# Patient Record
Sex: FEMALE | Race: ASIAN | NOT HISPANIC OR LATINO | URBAN - METROPOLITAN AREA
[De-identification: names, ages, dates, MRNs, and addresses within clinical notes are randomized per-mention and may not be internally consistent; named-entity substitution may affect disease eponyms.]

---

## 2021-08-31 ENCOUNTER — OFFICE VISIT (OUTPATIENT)
Dept: URBAN - METROPOLITAN AREA CLINIC 35 | Facility: CLINIC | Age: 41
End: 2021-08-31

## 2021-09-17 ENCOUNTER — OFFICE VISIT (OUTPATIENT)
Dept: URBAN - METROPOLITAN AREA CLINIC 35 | Facility: CLINIC | Age: 41
End: 2021-09-17

## 2021-09-17 VITALS
HEART RATE: 91 BPM | DIASTOLIC BLOOD PRESSURE: 78 MMHG | OXYGEN SATURATION: 98 % | HEIGHT: 60 IN | SYSTOLIC BLOOD PRESSURE: 124 MMHG | WEIGHT: 116 LBS | BODY MASS INDEX: 22.78 KG/M2

## 2021-09-17 RX ORDER — MAGNESIUM OXIDE/MAG AA CHELATE 300 MG
1 CAPSULE WITH A MEAL CAPSULE ORAL ONCE A DAY
Qty: 30 | Status: ACTIVE | COMMUNITY

## 2021-09-17 RX ORDER — DOCUSATE SODIUM 100 MG/1
2 CAPSULES CAPSULE ORAL ONCE A DAY
Qty: 60 CAPSULE | Status: ACTIVE | COMMUNITY

## 2021-09-17 NOTE — HPI-MIGRATED HPI
;     Constipation :                                  41 year old female patient presents for constipation consult. Patient states symptoms have been lifelong since laste teens and she is addressing for the first time now. . Patient admits trying colace and magnesium supplement  OTC for relief but states symptoms arent subsiding . Patient admits having 2-3 bowel movements a week. Patient admits stools are small and hard and pebble like . She admits adding more fiber from vegetables and fruit with no relief .  Patient denies rectal bleeding /melena.   Patient admits strenuous bowel movements.   Patient denies having recent abdominal Xray.   Patient denies having recent labs. ;

## 2021-09-17 NOTE — EXAM-MIGRATED EXAMINATIONS
General Examination : Medical assistant was in the room during the examination;     GENERAL APPEARANCE: - alert, in no acute distress, well developed, well nourished;   ORAL CAVITY: - mucosa moist;   NECK/THYROID: - neck supple, full range of motion;   HEART: - no murmurs, regular rate and rhythm, S1, S2 normal;   LUNGS: - clear to auscultation bilaterally, good air movement, no wheezes, rales, rhonchi;   ABDOMEN: - bowel sounds present, no masses palpable, no organomegaly , no rebound tenderness, soft, nontender, nondistended;

## 2021-10-14 ENCOUNTER — OFFICE VISIT (OUTPATIENT)
Dept: URBAN - METROPOLITAN AREA CLINIC 35 | Facility: CLINIC | Age: 41
End: 2021-10-14

## 2021-10-14 VITALS
WEIGHT: 113 LBS | HEART RATE: 110 BPM | DIASTOLIC BLOOD PRESSURE: 74 MMHG | OXYGEN SATURATION: 100 % | SYSTOLIC BLOOD PRESSURE: 106 MMHG | BODY MASS INDEX: 22.19 KG/M2 | HEIGHT: 60 IN

## 2021-10-14 PROBLEM — 88111009 CHANGE IN BOWEL HABIT: Status: ACTIVE | Noted: 2021-10-14

## 2021-10-14 PROBLEM — 14760008 CONSTIPATION: Status: ACTIVE | Noted: 2021-09-17

## 2021-10-14 PROBLEM — 249504006 FLATULENCE: Status: ACTIVE | Noted: 2021-09-17

## 2021-10-14 RX ORDER — SODIUM PICOSULFATE, MAGNESIUM OXIDE, AND ANHYDROUS CITRIC ACID 10; 3.5; 12 MG/160ML; G/160ML; G/160ML
AS DIRECTED LIQUID ORAL
Qty: 1 KIT | OUTPATIENT
Start: 2021-10-14

## 2021-10-14 RX ORDER — MAGNESIUM OXIDE/MAG AA CHELATE 300 MG
1 CAPSULE WITH A MEAL CAPSULE ORAL ONCE A DAY
Qty: 30 | Status: ON HOLD | COMMUNITY

## 2021-10-14 RX ORDER — DOCUSATE SODIUM 100 MG/1
2 CAPSULES CAPSULE ORAL ONCE A DAY
Qty: 60 CAPSULE | Status: ON HOLD | COMMUNITY

## 2021-10-14 NOTE — HPI-MIGRATED HPI
;     Constipation : Patient is present to for her 3 week follow-up for constipation. Patient was given an sample of Linzess 145mcg to help with some relief of her symptoms. Patient admits  medication help with  relief of her symptoms. She does state the 145mcg may be too strong . She takes it every 3 days . She admits she is having a bowel movement daily .   She also wanted to mention for the past 6 months she will have any episode where she has diarrhea for 3 days and then severe constipation. She states its from one extreme to the next . She denies any aggravting factors .   Last Visit (09/17/2021) 41 year old female patient presents for constipation consult. Patient states symptoms have been lifelong since laste teens and she is addressing for the first time now. . Patient admits trying colace and magnesium supplement  OTC for relief but states symptoms arent subsiding . Patient admits having 2-3 bowel movements a week. Patient admits stools are small and hard and pebble like . She admits adding more fiber from vegetables and fruit with no relief .  Patient denies rectal bleeding /melena.   Patient admits strenuous bowel movements.   Patient denies having recent abdominal Xray.   Patient denies having recent labs.;

## 2021-10-14 NOTE — EXAM-MIGRATED EXAMINATIONS
GENERAL APPEARANCE: - alert, in no acute distress, well developed, well nourished;   HEAD: - normocephalic, atraumatic;   ORAL CAVITY: - mucosa moist;   THROAT: - Mallampati classIII;   HEART: - no murmurs, regular rate and rhythm, S1, S2 normal;   LUNGS: - clear to auscultation bilaterally, good air movement, no wheezes, rales, rhonchi;   ABDOMEN: - bowel sounds present, no masses palpable, no organomegaly , no rebound tenderness, soft, nontender, nondistended;

## 2021-11-11 ENCOUNTER — OFFICE VISIT (OUTPATIENT)
Dept: URBAN - METROPOLITAN AREA SURGERY CENTER 8 | Facility: SURGERY CENTER | Age: 41
End: 2021-11-11

## 2021-11-15 ENCOUNTER — TELEPHONE ENCOUNTER (OUTPATIENT)
Dept: URBAN - METROPOLITAN AREA CLINIC 35 | Facility: CLINIC | Age: 41
End: 2021-11-15

## 2021-11-15 RX ORDER — SODIUM PICOSULFATE, MAGNESIUM OXIDE, AND ANHYDROUS CITRIC ACID 10; 3.5; 12 MG/160ML; G/160ML; G/160ML
ML LIQUID ORAL AS DIRECTED
Qty: 1 | Refills: 0 | OUTPATIENT
Start: 2021-11-15

## 2021-11-18 ENCOUNTER — OFFICE VISIT (OUTPATIENT)
Dept: URBAN - METROPOLITAN AREA SURGERY CENTER 8 | Facility: SURGERY CENTER | Age: 41
End: 2021-11-18

## 2021-11-30 ENCOUNTER — OFFICE VISIT (OUTPATIENT)
Dept: URBAN - METROPOLITAN AREA CLINIC 35 | Facility: CLINIC | Age: 41
End: 2021-11-30

## 2021-11-30 ENCOUNTER — DASHBOARD ENCOUNTERS (OUTPATIENT)
Age: 41
End: 2021-11-30

## 2021-11-30 VITALS
HEIGHT: 60 IN | OXYGEN SATURATION: 99 % | BODY MASS INDEX: 22.38 KG/M2 | DIASTOLIC BLOOD PRESSURE: 70 MMHG | HEART RATE: 70 BPM | WEIGHT: 114 LBS | SYSTOLIC BLOOD PRESSURE: 115 MMHG

## 2021-11-30 PROBLEM — 52231000 CHRONIC ULCERATIVE PROCTITIS: Status: ACTIVE | Noted: 2021-11-30

## 2021-11-30 RX ORDER — DOCUSATE SODIUM 100 MG/1
2 CAPSULES CAPSULE ORAL ONCE A DAY
Qty: 60 CAPSULE | Status: ON HOLD | COMMUNITY

## 2021-11-30 RX ORDER — MESALAMINE 1000 MG/1
1 SUPPOSITORY AT BEDTIME SUPPOSITORY RECTAL ONCE A DAY
Qty: 90 | Refills: 1 | OUTPATIENT
Start: 2021-11-30

## 2021-11-30 RX ORDER — LINACLOTIDE 72 UG/1
1 CAPSULE AT LEAST 30 MINUTES BEFORE THE FIRST MEAL OF THE DAY ON AN EMPTY STOMACH CAPSULE, GELATIN COATED ORAL ONCE A DAY
Qty: 90 | Refills: 1 | OUTPATIENT
Start: 2021-11-30

## 2021-11-30 RX ORDER — SODIUM PICOSULFATE, MAGNESIUM OXIDE, AND ANHYDROUS CITRIC ACID 10; 3.5; 12 MG/160ML; G/160ML; G/160ML
ML LIQUID ORAL AS DIRECTED
Qty: 1 | Refills: 0 | Status: ON HOLD | COMMUNITY
Start: 2021-11-15

## 2021-11-30 RX ORDER — LINACLOTIDE 72 UG/1
1 CAPSULE CAPSULE, GELATIN COATED ORAL ONCE A DAY
Status: ACTIVE | COMMUNITY

## 2021-11-30 RX ORDER — MAGNESIUM OXIDE/MAG AA CHELATE 300 MG
1 CAPSULE WITH A MEAL CAPSULE ORAL ONCE A DAY
Qty: 30 | Status: ON HOLD | COMMUNITY

## 2021-11-30 NOTE — HPI-MIGRATED HPI
;     Constipation : 42 y/o female patient presents today for follow up to her colonoscopy, which was completed on (11/18/2021) by Dr. Arriaga. Patient denies any complications after her procedure. Patient currently admits 1 bowel movements per day. Patient notes that initially her stools are formed and towards the end of the bowel movement, it will be loose. Patient denies any melena, blood or mucus in stools.   Patient continues to take Linzess 72 mcg every other day to help with some relief of her symptoms, but notes that she does not feel like she is completely emptying out.  Patient states that previously, she was taking 145 mcg with more frequent bowel movements, and felt like it was too much.   Patient denies any associated abdominal pain, heartburn, bloating, or gas.         Last Visit (10/14/2021) Patient is present to for her 3 week follow-up for constipation. Patient was given an sample of Linzess 145mcg to help with some relief of her symptoms. Patient admits  medication help with  relief of her symptoms. She does state the 145mcg may be too strong . She takes it every 3 days . She admits she is having a bowel movement daily .   She also wanted to mention for the past 6 months she will have any episode where she has diarrhea for 3 days and then severe constipation. She states its from one extreme to the next . She denies any aggravting factors .    Last Visit (09/17/2021) 41 year old female patient presents for constipation consult. Patient states symptoms have been lifelong since laste teens and she is addressing for the first time now. . Patient admits trying colace and magnesium supplement  OTC for relief but states symptoms arent subsiding . Patient admits having 2-3 bowel movements a week. Patient admits stools are small and hard and pebble like . She admits adding more fiber from vegetables and fruit with no relief .  Patient denies rectal bleeding /melena.   Patient admits strenuous bowel movements.   Patient denies having recent abdominal Xray.   Patient denies having recent labs.;

## 2022-01-25 ENCOUNTER — OFFICE VISIT (OUTPATIENT)
Dept: URBAN - METROPOLITAN AREA CLINIC 35 | Facility: CLINIC | Age: 42
End: 2022-01-25

## 2022-03-08 ENCOUNTER — OFFICE VISIT (OUTPATIENT)
Dept: URBAN - METROPOLITAN AREA CLINIC 35 | Facility: CLINIC | Age: 42
End: 2022-03-08

## 2023-09-27 ENCOUNTER — TELEPHONE ENCOUNTER (OUTPATIENT)
Dept: URBAN - METROPOLITAN AREA CLINIC 33 | Facility: CLINIC | Age: 43
End: 2023-09-27